# Patient Record
(demographics unavailable — no encounter records)

---
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
## 2024-12-18 NOTE — PHYSICAL EXAM
[FreeTextEntry1] : GENERAL PHYSICAL EXAM: GEN: no distress, normal affect  NEUROLOGICAL EXAM: Mental Status Orientation: alert and oriented to person, place, time, and situation Language: clear and fluent, intact comprehension and repetition. No dysarthria.   Cranial Nerves II: visual fields full to confrontation III, IV, VI: PERRL, EOMI V, VII: facial sensation and movement intact and symmetric VIII: hearing intact IX, X: uvula midline, soft palate elevates normally XI: BL shoulder shrug intact XII: tongue midline   Motor (weakness of the right side since neck/back injury) Shoulder abduction: 4+ (R), 5 (L) UE flexion: 4+ (R), 5 (L) UE extension: 4+ (R), 5 (L) Hand : 5- (R), 5 (L) Hip flexion: 4 (R), 5 (L) Knee flexion: 4 (R), 5 (L) Knee extension: 4 (R), 5 (L) Weak plantar/dorsiflexion on the right   Tone and bulk are normal in upper and lower limbs No pronator drift   Sensation Intact to light touch in all 4 EXTs    Coordination Normal FTN bilaterally Able to perform rapid, alternating movements   Gait Normal ambulation with no imbalance Tandem walk intact Negative Romberg

## 2024-12-18 NOTE — DISCUSSION/SUMMARY
[FreeTextEntry1] : Ms. Weill is a 37-year-old female, former smoker, with history of prescription drug abuse, asthma, PTSD who presents today for initial evaluation of headaches. Patient meets criteria for migraine headaches, possibly also with musculoskeletal component to her headaches contributing to increase in frequency/intensity since neck injury. CTH wo contrast 12/9/24 with no abnormal intracranial pathology. We discussed abortive and preventative therapy. She is interested in starting preventative therapy given daily frequency of her headaches. Plan to start nortriptyline 10 mg at bedtime along with rizatriptan 10 mg as needed. She was educated on directions for use and side effects of the medication. Hopefully nortriptyline will help with her sleep which may be an underlying trigger for her migraines. I addressed the importance of limiting abortive treatments to no more than 3x/week to prevent medication overuse headaches. I would like her to keep a headache journal to track headache frequency and response to treatment. She is aware that it can take approximately 2 months to assess full efficacy of nortriptyline. Plan to follow up in 3 months or sooner if needed. She will notify me if she experiences any side effects of the medications or new/unusual headaches.  All of the patient's questions and concerns were addressed.